# Patient Record
Sex: MALE
[De-identification: names, ages, dates, MRNs, and addresses within clinical notes are randomized per-mention and may not be internally consistent; named-entity substitution may affect disease eponyms.]

---

## 2017-01-27 PROBLEM — Z00.00 ENCOUNTER FOR PREVENTIVE HEALTH EXAMINATION: Status: ACTIVE | Noted: 2017-01-27

## 2017-02-13 ENCOUNTER — APPOINTMENT (OUTPATIENT)
Dept: PULMONOLOGY | Facility: CLINIC | Age: 57
End: 2017-02-13

## 2017-02-13 VITALS
HEIGHT: 71 IN | DIASTOLIC BLOOD PRESSURE: 81 MMHG | BODY MASS INDEX: 37.52 KG/M2 | TEMPERATURE: 98 F | OXYGEN SATURATION: 98 % | RESPIRATION RATE: 14 BRPM | SYSTOLIC BLOOD PRESSURE: 117 MMHG | WEIGHT: 268 LBS | HEART RATE: 65 BPM

## 2017-02-13 DIAGNOSIS — R06.02 SHORTNESS OF BREATH: ICD-10-CM

## 2017-02-13 DIAGNOSIS — M19.90 UNSPECIFIED OSTEOARTHRITIS, UNSPECIFIED SITE: ICD-10-CM

## 2017-02-13 DIAGNOSIS — Z83.3 FAMILY HISTORY OF DIABETES MELLITUS: ICD-10-CM

## 2017-02-13 DIAGNOSIS — I10 ESSENTIAL (PRIMARY) HYPERTENSION: ICD-10-CM

## 2017-02-27 ENCOUNTER — APPOINTMENT (OUTPATIENT)
Dept: PULMONOLOGY | Facility: CLINIC | Age: 57
End: 2017-02-27

## 2023-03-30 ENCOUNTER — APPOINTMENT (OUTPATIENT)
Dept: ORTHOPEDIC SURGERY | Facility: CLINIC | Age: 63
End: 2023-03-30
Payer: COMMERCIAL

## 2023-03-30 VITALS — HEIGHT: 71 IN | WEIGHT: 275 LBS | BODY MASS INDEX: 38.5 KG/M2

## 2023-03-30 DIAGNOSIS — I10 ESSENTIAL (PRIMARY) HYPERTENSION: ICD-10-CM

## 2023-03-30 DIAGNOSIS — E78.00 PURE HYPERCHOLESTEROLEMIA, UNSPECIFIED: ICD-10-CM

## 2023-03-30 PROCEDURE — 99204 OFFICE O/P NEW MOD 45 MIN: CPT

## 2023-03-30 PROCEDURE — 73502 X-RAY EXAM HIP UNI 2-3 VIEWS: CPT

## 2023-03-30 RX ORDER — MELOXICAM 15 MG/1
15 TABLET ORAL
Qty: 30 | Refills: 0 | Status: ACTIVE | COMMUNITY
Start: 2023-03-30 | End: 1900-01-01

## 2023-03-30 RX ORDER — ENALAPRIL MALEATE 5 MG/1
5 TABLET ORAL
Refills: 0 | Status: DISCONTINUED | COMMUNITY
End: 2023-03-30

## 2023-03-30 NOTE — HISTORY OF PRESENT ILLNESS
[Gradual] : gradual [7] : 7 [4] : 4 [Radiating] : radiating [Stabbing] : stabbing [Throbbing] : throbbing [Constant] : constant [Lying in bed] : lying in bed [Retired] : Work status: retired [de-identified] : 3/30/23: 61yo M with bilateral L>R hip and groin pain for the past few months with no injury. Denies prior hip issues; no formal tx to date. Admits to increasing pain and difficulty with prolonged walking and stairs. Having some difficulty with sleeping due to pain. He has tried Advil and Tylenol with mild relief.  [] : no [FreeTextEntry5] : pain started months ago with no cause of injury  [FreeTextEntry6] : numbness [FreeTextEntry7] : lt groin, lt buttock/thigh [FreeTextEntry9] : advil, tylenol

## 2023-03-30 NOTE — ASSESSMENT
[FreeTextEntry1] : 62M with adv bilateral hip OA\par \par Discussed anti-inflammatories, PT/HEP, IA hip CSI, and briefly ISAAC. He would like to try a course of Mobic and PT, return 6-8 weeks.\par \par The natural progression of Osteoarthritis was explained to the patient. We discussed the possible treatment options from conservative to operative. These included NSAIDS, Glucosamine and Chondrotin sulfate, and Physical Therapy. We also discussed that at some point they may progress to needed a ISAAC. Information and pamphlets were given.\par

## 2023-03-30 NOTE — PHYSICAL EXAM
[Right] : right hip [2+] : posterior tibialis pulse: 2+ [] : no erythema [Left] : left hip with pelvis [Severe arthritis (Tonnis Grade 3)] : Severe arthritis (Tonnis Grade 3)

## 2023-05-18 ENCOUNTER — APPOINTMENT (OUTPATIENT)
Dept: ORTHOPEDIC SURGERY | Facility: CLINIC | Age: 63
End: 2023-05-18
Payer: COMMERCIAL

## 2023-05-18 PROCEDURE — 99214 OFFICE O/P EST MOD 30 MIN: CPT

## 2023-05-18 RX ORDER — MELOXICAM 15 MG/1
15 TABLET ORAL
Qty: 30 | Refills: 1 | Status: ACTIVE | COMMUNITY
Start: 2023-05-18 | End: 1900-01-01

## 2023-05-18 NOTE — PHYSICAL EXAM
[Right] : right hip [2+] : posterior tibialis pulse: 2+ [Left] : left hip with pelvis [Severe arthritis (Tonnis Grade 3)] : Severe arthritis (Tonnis Grade 3) [] : no erythema

## 2023-05-18 NOTE — ASSESSMENT
[FreeTextEntry1] : 62M with adv bilateral hip OA\par \par Continue PT\par Meloxicam refilled\par return 4-6 weeks \par \par The natural progression of Osteoarthritis was explained to the patient. We discussed the possible treatment options from conservative to operative. These included NSAIDS, Glucosamine and Chondrotin sulfate, and Physical Therapy as well different types of injections. We also discussed that at some point they may progress to needed a TKA. Information and pamphlets were given.\par  \par \par

## 2023-05-18 NOTE — HISTORY OF PRESENT ILLNESS
[5] : 5 [3] : 3 [de-identified] : 3/30/23: 61yo M with bilateral L>R hip and groin pain for the past few months with no injury. Denies prior hip issues; no formal tx to date. Admits to increasing pain and difficulty with prolonged walking and stairs. Having some difficulty with sleeping due to pain. He has tried Advil and Tylenol with mild relief. \par \par 5/18/23: Here for f/up bilateral hips. pain has improved with PT and meloxicam, feeling good now [] : no [FreeTextEntry1] : B/L hips [de-identified] : PT- 2x a week , ,medication

## 2023-06-15 ENCOUNTER — APPOINTMENT (OUTPATIENT)
Dept: ORTHOPEDIC SURGERY | Facility: CLINIC | Age: 63
End: 2023-06-15

## 2023-06-29 ENCOUNTER — APPOINTMENT (OUTPATIENT)
Dept: ORTHOPEDIC SURGERY | Facility: CLINIC | Age: 63
End: 2023-06-29
Payer: COMMERCIAL

## 2023-06-29 DIAGNOSIS — M16.11 UNILATERAL PRIMARY OSTEOARTHRITIS, RIGHT HIP: ICD-10-CM

## 2023-06-29 DIAGNOSIS — M16.12 UNILATERAL PRIMARY OSTEOARTHRITIS, LEFT HIP: ICD-10-CM

## 2023-06-29 PROCEDURE — 99214 OFFICE O/P EST MOD 30 MIN: CPT

## 2023-06-29 NOTE — HISTORY OF PRESENT ILLNESS
[1] : 2 [0] : 0 [Retired] : Work status: retired [de-identified] : 3/30/23: 61yo M with bilateral L>R hip and groin pain for the past few months with no injury. Denies prior hip issues; no formal tx to date. Admits to increasing pain and difficulty with prolonged walking and stairs. Having some difficulty with sleeping due to pain. He has tried Advil and Tylenol with mild relief. \par \par 5/18/23: Here for f/up bilateral hips. pain has improved with PT and meloxicam, feeling good now\par 6/29/23 f/u daniela hips, doing well, pain improved with PT, still doing HEP, using meloxicam sparingly [FreeTextEntry1] : b/l hips [de-identified] : PT- helped but finished 6/19/23

## 2023-06-29 NOTE — DISCUSSION/SUMMARY
[de-identified] : The patient's current medication management of their orthopedic diagnosis was reviewed today: \par \par (1) We discussed a comprehensive treatment plan that included possible pharmaceutical management involving the use of prescription strength medications including but not limited to options such as oral Naprosyn 500mg BID, once daily Meloxicam 15 mg, or 500-650 mg Tylenol versus over the counter oral medications and topical prescription NSAID Pennsaid vs over the counter Voltaren gel. \par \par (2) There is a moderate risk of morbidity with further treatment, especially from use of prescription strength medications and possible side effects of these medications which include upset stomach with oral medications, skin reactions to topical medications and cardiac/renal issues with long term use. \par \par (3) I recommended that the patient follow-up with their medical physician to discuss any significant specific potential issues with long term medication use such as interactions with current medications or with exacerbation of underlying medical comorbidities. \par \par (4) The benefits and risks associated with use of injectable, oral or topical, prescription and over the counter anti-inflammatory medications were discussed with the patient. The patient voiced understanding of the risks including but not limited to bleeding, stroke, kidney dysfunction, heart disease, and were referred to the black box warning label for further information.\par \par Prior to appointment and during encounter with patient extensive medical records were reviewed including but not limited to, hospital records, out patient records, imaging results, and lab data. During this appointment the patient was examined, diagnoses were discussed and explained in a face to face manner. In addition extensive time was spent reviewing aforementioned diagnostic studies. Counseling including abnormal image results, differential diagnoses, treatment options, risk and benefits, lifestyle changes, current condition, and current medications was performed. Patient's comments, questions, and concerns were address and patient verbalized understanding.\par

## 2023-06-29 NOTE — ASSESSMENT
[FreeTextEntry1] : 62M with adv bilateral hip OA\par \par Continue hep\par Meloxicam as needed\par return 3-6mo\par \par The natural progression of Osteoarthritis was explained to the patient. We discussed the possible treatment options from conservative to operative. These included NSAIDS, Glucosamine and Chondrotin sulfate, and Physical Therapy as well different types of injections. We also discussed that at some point they may progress to needed a ISAAC. Information and pamphlets were given.\par  \par \par

## 2024-10-25 ENCOUNTER — OFFICE (OUTPATIENT)
Dept: URBAN - METROPOLITAN AREA CLINIC 12 | Facility: CLINIC | Age: 64
Setting detail: OPHTHALMOLOGY
End: 2024-10-25
Payer: MEDICARE

## 2024-10-25 DIAGNOSIS — H25.13: ICD-10-CM

## 2024-10-25 DIAGNOSIS — H35.033: ICD-10-CM

## 2024-10-25 DIAGNOSIS — H43.393: ICD-10-CM

## 2024-10-25 DIAGNOSIS — H43.813: ICD-10-CM

## 2024-10-25 PROBLEM — H52.7 REFRACTIVE ERROR: Status: ACTIVE | Noted: 2024-10-25

## 2024-10-25 PROCEDURE — 92004 COMPRE OPH EXAM NEW PT 1/>: CPT | Performed by: OPHTHALMOLOGY

## 2024-10-25 PROCEDURE — 92250 FUNDUS PHOTOGRAPHY W/I&R: CPT | Performed by: OPHTHALMOLOGY

## 2024-10-25 ASSESSMENT — KERATOMETRY
OD_AXISANGLE_DEGREES: 85
OS_K2POWER_DIOPTERS: 42.50
OS_K1POWER_DIOPTERS: 42.25
OD_K2POWER_DIOPTERS: 42.75
OD_K1POWER_DIOPTERS: 42.50
OS_AXISANGLE_DEGREES: 68

## 2024-10-25 ASSESSMENT — VISUAL ACUITY
OD_BCVA: 20/40
OS_BCVA: 20/25

## 2024-10-25 ASSESSMENT — REFRACTION_AUTOREFRACTION
OS_CYLINDER: -1.00
OS_SPHERE: -2.25
OD_AXIS: 106
OD_SPHERE: -3.25
OS_AXIS: 86
OD_CYLINDER: -0.50

## 2024-10-25 ASSESSMENT — REFRACTION_CURRENTRX
OD_CYLINDER: -0.25
OS_SPHERE: -3.25
OD_VPRISM_DIRECTION: BF
OD_OVR_VA: 20/
OS_AXIS: 91
OS_ADD: +2.50
OS_VPRISM_DIRECTION: BF
OD_SPHERE: -4.25
OD_AXIS: 86
OS_CYLINDER: -0.75
OS_OVR_VA: 20/
OD_ADD: +2.50

## 2024-10-25 ASSESSMENT — CONFRONTATIONAL VISUAL FIELD TEST (CVF)
OS_FINDINGS: FULL
OD_FINDINGS: FULL

## 2024-10-25 ASSESSMENT — TONOMETRY
OS_IOP_MMHG: 16
OD_IOP_MMHG: 15

## 2024-12-01 ENCOUNTER — EMERGENCY (EMERGENCY)
Facility: HOSPITAL | Age: 64
LOS: 1 days | Discharge: HOME | End: 2024-12-01
Attending: EMERGENCY MEDICINE
Payer: MEDICARE

## 2024-12-01 VITALS
DIASTOLIC BLOOD PRESSURE: 72 MMHG | RESPIRATION RATE: 19 BRPM | SYSTOLIC BLOOD PRESSURE: 121 MMHG | HEART RATE: 63 BPM | TEMPERATURE: 98 F | OXYGEN SATURATION: 96 %

## 2024-12-01 VITALS
HEIGHT: 71 IN | DIASTOLIC BLOOD PRESSURE: 86 MMHG | RESPIRATION RATE: 18 BRPM | WEIGHT: 251.99 LBS | TEMPERATURE: 99 F | SYSTOLIC BLOOD PRESSURE: 147 MMHG | OXYGEN SATURATION: 97 % | HEART RATE: 67 BPM

## 2024-12-01 PROCEDURE — 96372 THER/PROPH/DIAG INJ SC/IM: CPT

## 2024-12-01 PROCEDURE — 93005 ELECTROCARDIOGRAM TRACING: CPT

## 2024-12-01 PROCEDURE — 93010 ELECTROCARDIOGRAM REPORT: CPT

## 2024-12-01 PROCEDURE — 99284 EMERGENCY DEPT VISIT MOD MDM: CPT

## 2024-12-01 PROCEDURE — 99284 EMERGENCY DEPT VISIT MOD MDM: CPT | Mod: 25

## 2024-12-01 RX ORDER — KETOROLAC TROMETHAMINE 30 MG/ML
30 INJECTION INTRAMUSCULAR; INTRAVENOUS ONCE
Refills: 0 | Status: DISCONTINUED | OUTPATIENT
Start: 2024-12-01 | End: 2024-12-01

## 2024-12-01 RX ORDER — METHYLPREDNISOLONE SOD SUCC 125 MG
1 VIAL (EA) INJECTION
Qty: 1 | Refills: 0
Start: 2024-12-01

## 2024-12-01 RX ORDER — LIDOCAINE 40 MG/G
1 CREAM TOPICAL ONCE
Refills: 0 | Status: DISCONTINUED | OUTPATIENT
Start: 2024-12-01 | End: 2024-12-09

## 2024-12-01 RX ORDER — CYCLOBENZAPRINE HCL 10 MG
1 TABLET ORAL
Qty: 15 | Refills: 0
Start: 2024-12-01 | End: 2024-12-05

## 2024-12-01 RX ORDER — DIAZEPAM 10 MG/1
10 TABLET ORAL ONCE
Refills: 0 | Status: DISCONTINUED | OUTPATIENT
Start: 2024-12-01 | End: 2024-12-01

## 2024-12-01 RX ORDER — IBUPROFEN 200 MG
1 TABLET ORAL
Qty: 15 | Refills: 0
Start: 2024-12-01 | End: 2024-12-05

## 2024-12-01 RX ORDER — DEXAMETHASONE 1.5 MG/1
8 TABLET ORAL ONCE
Refills: 0 | Status: COMPLETED | OUTPATIENT
Start: 2024-12-01 | End: 2024-12-01

## 2024-12-01 RX ORDER — LIDOCAINE 40 MG/G
1 CREAM TOPICAL
Qty: 1 | Refills: 0
Start: 2024-12-01 | End: 2024-12-05

## 2024-12-01 RX ADMIN — DIAZEPAM 10 MILLIGRAM(S): 10 TABLET ORAL at 16:18

## 2024-12-01 RX ADMIN — KETOROLAC TROMETHAMINE 30 MILLIGRAM(S): 30 INJECTION INTRAMUSCULAR; INTRAVENOUS at 16:18

## 2024-12-01 RX ADMIN — DEXAMETHASONE 8 MILLIGRAM(S): 1.5 TABLET ORAL at 16:18

## 2024-12-01 NOTE — ED PROVIDER NOTE - PHYSICAL EXAMINATION
Const: Awake, alert and oriented. In no acute distress. Well appearing.  HEENT: NC/AT. Moist mucous membranes.  Eyes: No scleral icterus. EOMI.  Neck:. Soft and supple. Full ROM without pain.  Cardiac: Regular rate and regular rhythm. +S1/S2. No murmurs. Peripheral pulses 2+ and symmetric. No LE edema.  Resp: Speaking in full sentences. No evidence of respiratory distress. No wheezes, rales or rhonchi.  Abd: Soft, non-tender, non-distended. Normal bowel sounds in all 4 quadrants. No guarding or rebound.  Back: Spine midline and non-tender. No CVAT. Right lumbar paraspinal hypertonicity and TTP with reproduction of symptoms. No SI joint TTP.  Skin: No rashes, abrasions or lacerations.  Neuro: Awake, alert & oriented x 3. Moves all extremities symmetrically. Strength/sensation intact b/l LE's.

## 2024-12-01 NOTE — ED PROVIDER NOTE - NSFOLLOWUPINSTRUCTIONS_ED_ALL_ED_FT
- Start taking the Medrol dosepak tomorrow. All other medication you can take before you go to bed.    - Make sure you sit in supportive chairs (kitchen chair/dining room chair) and avoid soft seats such as couch or soft bed.    - Do not bend down from the hips - bend from the knees and keep your back straight. Do not lift anything heavy. Try not to make quick movements.    Low Back Sprain or Strain Rehab  Ask your health care provider which exercises are safe for you. Do exercises exactly as told by your health care provider and adjust them as directed. It is normal to feel mild stretching, pulling, tightness, or discomfort as you do these exercises. Stop right away if you feel sudden pain or your pain gets worse. Do not begin these exercises until told by your health care provider.    Stretching and range-of-motion exercises  These exercises warm up your muscles and joints and improve the movement and flexibility of your back. These exercises also help to relieve pain, numbness, and tingling.    Lumbar rotation      Lie on your back on a firm bed or the floor with your knees bent.  Straighten your arms out to your sides so each arm forms a 90-degree angle (right angle) with a side of your body.  Slowly move (rotate) both of your knees to one side of your body until you feel a stretch in your lower back (lumbar). Try not to let your shoulders lift off the floor.  Hold this position for __________ seconds.  Tense your abdominal muscles and slowly move your knees back to the starting position.  Repeat this exercise on the other side of your body.  Repeat __________ times. Complete this exercise __________ times a day.    Single knee to chest      Lie on your back on a firm bed or the floor with both legs straight.  Bend one of your knees. Use your hands to move your knee up toward your chest until you feel a gentle stretch in your lower back and buttock.  Hold your leg in this position by holding on to the front of your knee.  Keep your other leg as straight as possible.  Hold this position for __________ seconds.  Slowly return to the starting position.  Repeat with your other leg.  Repeat __________ times. Complete this exercise __________ times a day.    Prone extension on elbows      Lie on your abdomen on a firm bed or the floor (prone position).  Prop yourself up on your elbows.  Use your arms to help lift your chest up until you feel a gentle stretch in your abdomen and your lower back.  This will place some of your body weight on your elbows. If this is uncomfortable, try stacking pillows under your chest.  Your hips should stay down, against the surface that you are lying on. Keep your hip and back muscles relaxed.  Hold this position for __________ seconds.  Slowly relax your upper body and return to the starting position.  Repeat __________ times. Complete this exercise __________ times a day.    Strengthening exercises  These exercises build strength and endurance in your back. Endurance is the ability to use your muscles for a long time, even after they get tired.    Pelvic tilt    This exercise strengthens the muscles that lie deep in the abdomen.  Lie on your back on a firm bed or the floor with your legs extended.  Bend your knees so they are pointing toward the ceiling and your feet are flat on the floor.  Tighten your lower abdominal muscles to press your lower back against the floor. This motion will tilt your pelvis so your tailbone points up toward the ceiling instead of pointing to your feet or the floor.  To help with this exercise, you may place a small towel under your lower back and try to push your back into the towel.  Hold this position for __________ seconds.  Let your muscles relax completely before you repeat this exercise.  Repeat __________ times. Complete this exercise __________ times a day.    Alternating arm and leg raises      Get on your hands and knees on a firm surface. If you are on a hard floor, you may want to use padding, such as an exercise mat, to cushion your knees.  Line up your arms and legs. Your hands should be directly below your shoulders, and your knees should be directly below your hips.  Lift your left leg behind you. At the same time, raise your right arm and straighten it in front of you.  Do not lift your leg higher than your hip.  Do not lift your arm higher than your shoulder.  Keep your abdominal and back muscles tight.  Keep your hips facing the ground.  Do not arch your back.  Keep your balance carefully, and do not hold your breath.  Hold this position for __________ seconds.  Slowly return to the starting position.  Repeat with your right leg and your left arm.  Repeat __________ times. Complete this exercise __________ times a day.    Abdominal set with straight leg raise      Lie on your back on a firm bed or the floor.  Bend one of your knees and keep your other leg straight.  Tense your abdominal muscles and lift your straight leg up, 4–6 inches (10–15 cm) off the ground.  Keep your abdominal muscles tight and hold this position for __________ seconds.  Do not hold your breath.  Do not arch your back. Keep it flat against the ground.  Keep your abdominal muscles tense as you slowly lower your leg back to the starting position.  Repeat with your other leg.  Repeat __________ times. Complete this exercise __________ times a day.    Single leg lower with bent knees    Lie on your back on a firm bed or the floor.  Tense your abdominal muscles and lift your feet off the floor, one foot at a time, so your knees and hips are bent in 90-degree angles (right angles).  Your knees should be over your hips and your lower legs should be parallel to the floor.  Keeping your abdominal muscles tense and your knee bent, slowly lower one of your legs so your toe touches the ground.  Lift your leg back up to return to the starting position.  Do not hold your breath.  Do not let your back arch. Keep your back flat against the ground.  Repeat with your other leg.  Repeat __________ times. Complete this exercise __________ times a day.    Posture and body mechanics  Good posture and healthy body mechanics can help to relieve stress in your body's tissues and joints. Body mechanics refers to the movements and positions of your body while you do your daily activities. Posture is part of body mechanics. Good posture means:  Your spine is in its natural S-curve position (neutral).  Your shoulders are pulled back slightly.  Your head is not tipped forward (neutral).  Follow these guidelines to improve your posture and body mechanics in your everyday activities.    Standing    A comparison showing good and bad posture while standing.  When standing, keep your spine neutral and your feet about hip-width apart. Keep a slight bend in your knees. Your ears, shoulders, and hips should line up.  When you do a task in which you  one place for a long time, place one foot up on a stable object that is 2–4 inches (5–10 cm) high, such as a footstool. This helps keep your spine neutral.  Sitting    A comparison showing good and bad posture while sitting at a desk.  When sitting, keep your spine neutral and keep your feet flat on the floor. Use a footrest, if necessary, and keep your thighs parallel to the floor. Avoid rounding your shoulders, and avoid tilting your head forward.  When working at a desk or a computer, keep your desk at a height where your hands are slightly lower than your elbows. Slide your chair under your desk so you are close enough to maintain good posture.  When working at a computer, place your monitor at a height where you are looking straight ahead and you do not have to tilt your head forward or downward to look at the screen.  Resting    When lying down and resting, avoid positions that are most painful for you.  If you have pain with activities such as sitting, bending, stooping, or squatting, lie in a position in which your body does not bend very much. For example, avoid curling up on your side with your arms and knees near your chest (fetal position).  If you have pain with activities such as standing for a long time or reaching with your arms, lie with your spine in a neutral position and bend your knees slightly. Try the following positions:  Lying on your side with a pillow between your knees.  Lying on your back with a pillow under your knees.  Lifting    A comparison showing the right and wrong way to lift a heavy object.  When lifting objects, keep your feet at least shoulder-width apart and tighten your abdominal muscles.  Bend your knees and hips and keep your spine neutral. It is important to lift using the strength of your legs, not your back. Do not lock your knees straight out.  Always ask for help to lift heavy or awkward objects.  This information is not intended to replace advice given to you by your health care provider. Make sure you discuss any questions you have with your health care provider.

## 2024-12-01 NOTE — ED PROVIDER NOTE - PATIENT PORTAL LINK FT
You can access the FollowMyHealth Patient Portal offered by [facility] by registering at the following website: [email or web address]. By joining [facility]’s FollowMyHealth portal, you will also be able to view your health information using other applications (apps) compatible with our system.

## 2024-12-01 NOTE — ED PROVIDER NOTE - OBJECTIVE STATEMENT
64-year-old male with past medical history hypertension, hyperlipidemia, bilateral hip arthritis presents for atraumatic back pain for the past 3 days.  The back pain is low, right-sided, does not radiate down his leg.  It is exacerbated by sitting, standing, walking, and feels better when laying down.  He denies any fevers, chills, abdominal pain, bladder/bowel dysfunction or saddle anesthesia.  He has had similar back pain in the past.  He took meloxicam this morning, 15 mg, without improvement.  He denies allergies to medications.

## 2024-12-01 NOTE — ED PROVIDER NOTE - NSICDXPASTMEDICALHX_GEN_ALL_CORE_FT
PAST MEDICAL HISTORY:  Bilateral hip joint arthritis     HLD (hyperlipidemia)     HTN (hypertension)

## 2024-12-01 NOTE — ED PROVIDER NOTE - CLINICAL SUMMARY MEDICAL DECISION MAKING FREE TEXT BOX
64-year-old male with past medical history hypertension, hyperlipidemia, bilateral hip arthritis presents for atraumatic right low back pain for the past 3 days.  His pain is exacerbated with movement and sitting, and improved at rest.  He has no neurovascular signs or symptoms, and does have reproducible muscle spasm on exam.  Will treat with anti-inflammatories, muscle relaxer and lidocaine and reassess.

## 2024-12-01 NOTE — ED PROVIDER NOTE - PROGRESS NOTE DETAILS
Kirsty Coffman, DO: Patient up, ambulating steadily, feels much better.  Prescription sent to patient's pharmacy, discussed taking medication around-the-clock for the next 24 hours and then as needed going forward.  Advised against any bending, heavy lifting, and advised sitting in supportive chairs.  Patient can follow-up with primary care doctor as needed.

## 2024-12-04 PROBLEM — M16.0 BILATERAL PRIMARY OSTEOARTHRITIS OF HIP: Chronic | Status: ACTIVE | Noted: 2024-12-01

## 2024-12-04 PROBLEM — E78.5 HYPERLIPIDEMIA, UNSPECIFIED: Chronic | Status: ACTIVE | Noted: 2024-12-01

## 2024-12-04 PROBLEM — I10 ESSENTIAL (PRIMARY) HYPERTENSION: Chronic | Status: ACTIVE | Noted: 2024-12-01

## 2024-12-06 ENCOUNTER — APPOINTMENT (OUTPATIENT)
Dept: ORTHOPEDIC SURGERY | Facility: CLINIC | Age: 64
End: 2024-12-06
Payer: MEDICARE

## 2024-12-06 VITALS — BODY MASS INDEX: 36.4 KG/M2 | HEIGHT: 71 IN | WEIGHT: 260 LBS

## 2024-12-06 DIAGNOSIS — G89.29 LOW BACK PAIN, UNSPECIFIED: ICD-10-CM

## 2024-12-06 DIAGNOSIS — M79.10 MYALGIA, UNSPECIFIED SITE: ICD-10-CM

## 2024-12-06 DIAGNOSIS — M54.50 LOW BACK PAIN, UNSPECIFIED: ICD-10-CM

## 2024-12-06 DIAGNOSIS — M47.816 SPONDYLOSIS W/OUT MYELOPATHY OR RADICULOPATHY, LUMBAR REGION: ICD-10-CM

## 2024-12-06 PROCEDURE — J3490M: CUSTOM | Mod: NC

## 2024-12-06 PROCEDURE — 72100 X-RAY EXAM L-S SPINE 2/3 VWS: CPT

## 2024-12-06 PROCEDURE — 20552 NJX 1/MLT TRIGGER POINT 1/2: CPT

## 2024-12-06 PROCEDURE — 99203 OFFICE O/P NEW LOW 30 MIN: CPT | Mod: 25

## 2024-12-06 RX ORDER — METHOCARBAMOL 500 MG/1
500 TABLET, FILM COATED ORAL
Refills: 0 | Status: ACTIVE | COMMUNITY

## 2024-12-06 RX ORDER — CELECOXIB 200 MG/1
200 CAPSULE ORAL
Qty: 60 | Refills: 2 | Status: ACTIVE | COMMUNITY
Start: 2024-12-06 | End: 1900-01-01

## 2024-12-06 RX ORDER — CELECOXIB 100 MG/1
100 CAPSULE ORAL
Refills: 0 | Status: ACTIVE | COMMUNITY

## 2024-12-06 RX ORDER — ASPIRIN 81 MG
81 TABLET, DELAYED RELEASE (ENTERIC COATED) ORAL
Refills: 0 | Status: ACTIVE | COMMUNITY

## 2024-12-06 RX ORDER — ROSUVASTATIN CALCIUM 20 MG/1
20 TABLET, FILM COATED ORAL
Refills: 0 | Status: ACTIVE | COMMUNITY

## 2024-12-06 RX ORDER — TRAMADOL HYDROCHLORIDE 50 MG/1
50 TABLET, COATED ORAL
Refills: 0 | Status: ACTIVE | COMMUNITY

## 2024-12-06 RX ORDER — LOSARTAN POTASSIUM AND HYDROCHLOROTHIAZIDE 12.5; 5 MG/1; MG/1
50-12.5 TABLET ORAL
Refills: 0 | Status: ACTIVE | COMMUNITY

## 2024-12-06 RX ORDER — METHOCARBAMOL 750 MG/1
750 TABLET, FILM COATED ORAL EVERY 8 HOURS
Qty: 60 | Refills: 0 | Status: ACTIVE | COMMUNITY
Start: 2024-12-06 | End: 1900-01-01

## 2024-12-06 RX ORDER — FAMOTIDINE 20 MG/1
20 TABLET, FILM COATED ORAL
Refills: 0 | Status: ACTIVE | COMMUNITY

## 2024-12-20 ENCOUNTER — APPOINTMENT (OUTPATIENT)
Dept: ORTHOPEDIC SURGERY | Facility: CLINIC | Age: 64
End: 2024-12-20
Payer: MEDICARE

## 2024-12-20 VITALS — HEIGHT: 71 IN | BODY MASS INDEX: 36.4 KG/M2 | WEIGHT: 260 LBS

## 2024-12-20 DIAGNOSIS — M47.816 SPONDYLOSIS W/OUT MYELOPATHY OR RADICULOPATHY, LUMBAR REGION: ICD-10-CM

## 2024-12-20 PROCEDURE — 99213 OFFICE O/P EST LOW 20 MIN: CPT
